# Patient Record
Sex: FEMALE | Race: WHITE | NOT HISPANIC OR LATINO | URBAN - METROPOLITAN AREA
[De-identification: names, ages, dates, MRNs, and addresses within clinical notes are randomized per-mention and may not be internally consistent; named-entity substitution may affect disease eponyms.]

---

## 2017-08-07 ENCOUNTER — INPATIENT (INPATIENT)
Facility: HOSPITAL | Age: 82
LOS: 9 days | Discharge: NURSING, REHAB OR LONG-TERM CARE | End: 2017-08-17
Payer: MEDICARE

## 2017-08-07 ENCOUNTER — OUTPATIENT (OUTPATIENT)
Dept: OUTPATIENT SERVICES | Facility: HOSPITAL | Age: 82
LOS: 1 days | End: 2017-08-07

## 2017-08-07 PROCEDURE — 70450 CT HEAD/BRAIN W/O DYE: CPT | Mod: 26

## 2017-08-07 PROCEDURE — 99223 1ST HOSP IP/OBS HIGH 75: CPT

## 2017-08-07 PROCEDURE — 72125 CT NECK SPINE W/O DYE: CPT | Mod: 26

## 2017-08-07 PROCEDURE — 99285 EMERGENCY DEPT VISIT HI MDM: CPT

## 2017-08-07 PROCEDURE — 73503 X-RAY EXAM HIP UNI 4/> VIEWS: CPT | Mod: 26,LT

## 2017-08-07 PROCEDURE — 72170 X-RAY EXAM OF PELVIS: CPT | Mod: 26

## 2017-08-07 PROCEDURE — 71010: CPT | Mod: 26

## 2017-08-09 ENCOUNTER — OUTPATIENT (OUTPATIENT)
Dept: OUTPATIENT SERVICES | Facility: HOSPITAL | Age: 82
LOS: 1 days | End: 2017-08-09

## 2017-08-09 PROCEDURE — 73501 X-RAY EXAM HIP UNI 1 VIEW: CPT | Mod: 26,LT

## 2017-08-10 ENCOUNTER — OUTPATIENT (OUTPATIENT)
Dept: OUTPATIENT SERVICES | Facility: HOSPITAL | Age: 82
LOS: 1 days | End: 2017-08-10

## 2017-08-10 PROCEDURE — 99232 SBSQ HOSP IP/OBS MODERATE 35: CPT

## 2017-08-11 ENCOUNTER — OUTPATIENT (OUTPATIENT)
Dept: OUTPATIENT SERVICES | Facility: HOSPITAL | Age: 82
LOS: 1 days | End: 2017-08-11

## 2017-08-12 ENCOUNTER — OUTPATIENT (OUTPATIENT)
Dept: OUTPATIENT SERVICES | Facility: HOSPITAL | Age: 82
LOS: 1 days | End: 2017-08-12

## 2017-08-13 ENCOUNTER — OUTPATIENT (OUTPATIENT)
Dept: OUTPATIENT SERVICES | Facility: HOSPITAL | Age: 82
LOS: 1 days | End: 2017-08-13

## 2017-08-14 ENCOUNTER — OUTPATIENT (OUTPATIENT)
Dept: OUTPATIENT SERVICES | Facility: HOSPITAL | Age: 82
LOS: 1 days | End: 2017-08-14

## 2017-08-14 PROCEDURE — 72193 CT PELVIS W/DYE: CPT | Mod: 26

## 2017-08-15 ENCOUNTER — OUTPATIENT (OUTPATIENT)
Dept: OUTPATIENT SERVICES | Facility: HOSPITAL | Age: 82
LOS: 1 days | End: 2017-08-15

## 2017-08-16 ENCOUNTER — OUTPATIENT (OUTPATIENT)
Dept: OUTPATIENT SERVICES | Facility: HOSPITAL | Age: 82
LOS: 1 days | End: 2017-08-16

## 2017-08-17 ENCOUNTER — INPATIENT (INPATIENT)
Facility: HOSPITAL | Age: 82
LOS: 40 days | Discharge: HOME | End: 2017-09-27
Payer: COMMERCIAL

## 2017-08-17 ENCOUNTER — OUTPATIENT (OUTPATIENT)
Dept: OUTPATIENT SERVICES | Facility: HOSPITAL | Age: 82
LOS: 1 days | End: 2017-08-17

## 2017-08-19 PROCEDURE — 73502 X-RAY EXAM HIP UNI 2-3 VIEWS: CPT | Mod: 26,RT

## 2017-08-24 PROBLEM — Z00.00 ENCOUNTER FOR PREVENTIVE HEALTH EXAMINATION: Status: ACTIVE | Noted: 2017-08-24

## 2017-09-05 PROCEDURE — 73630 X-RAY EXAM OF FOOT: CPT | Mod: 26,LT

## 2017-09-05 PROCEDURE — 73600 X-RAY EXAM OF ANKLE: CPT | Mod: 26,LT

## 2017-09-12 PROCEDURE — 72170 X-RAY EXAM OF PELVIS: CPT | Mod: 26

## 2017-09-12 PROCEDURE — 73562 X-RAY EXAM OF KNEE 3: CPT | Mod: 26,LT

## 2017-09-17 ENCOUNTER — EMERGENCY (EMERGENCY)
Facility: HOSPITAL | Age: 82
LOS: 0 days | Discharge: NURSING, REHAB OR LONG-TERM CARE | End: 2017-09-17
Payer: MEDICARE

## 2017-09-17 PROCEDURE — 73562 X-RAY EXAM OF KNEE 3: CPT | Mod: 26,LT

## 2017-09-17 PROCEDURE — 74177 CT ABD & PELVIS W/CONTRAST: CPT | Mod: 26

## 2017-09-17 PROCEDURE — 72125 CT NECK SPINE W/O DYE: CPT | Mod: 26

## 2017-09-17 PROCEDURE — 73610 X-RAY EXAM OF ANKLE: CPT | Mod: 26,LT

## 2017-09-17 PROCEDURE — 70450 CT HEAD/BRAIN W/O DYE: CPT | Mod: 26

## 2017-09-17 PROCEDURE — 99284 EMERGENCY DEPT VISIT MOD MDM: CPT

## 2017-09-17 PROCEDURE — 71260 CT THORAX DX C+: CPT | Mod: 26

## 2017-09-17 PROCEDURE — 73503 X-RAY EXAM HIP UNI 4/> VIEWS: CPT | Mod: 26,LT

## 2017-09-20 PROCEDURE — 71101 X-RAY EXAM UNILAT RIBS/CHEST: CPT | Mod: 26,LT

## 2019-04-02 ENCOUNTER — APPOINTMENT (OUTPATIENT)
Dept: ORTHOPEDIC SURGERY | Facility: CLINIC | Age: 84
End: 2019-04-02
Payer: MEDICARE

## 2019-04-02 VITALS — HEIGHT: 67 IN | BODY MASS INDEX: 23.86 KG/M2 | WEIGHT: 152 LBS

## 2019-04-02 DIAGNOSIS — S72.009A FRACTURE OF UNSPECIFIED PART OF NECK OF UNSPECIFIED FEMUR, INITIAL ENCOUNTER FOR CLOSED FRACTURE: ICD-10-CM

## 2019-04-02 DIAGNOSIS — M17.10 UNILATERAL PRIMARY OSTEOARTHRITIS, UNSPECIFIED KNEE: ICD-10-CM

## 2019-04-02 DIAGNOSIS — M16.10 UNILATERAL PRIMARY OSTEOARTHRITIS, UNSPECIFIED HIP: ICD-10-CM

## 2019-04-02 PROCEDURE — 99203 OFFICE O/P NEW LOW 30 MIN: CPT

## 2019-04-02 RX ORDER — RIVAROXABAN 15 MG/1
15 TABLET, FILM COATED ORAL
Qty: 90 | Refills: 0 | Status: ACTIVE | COMMUNITY
Start: 2018-10-19

## 2019-04-02 RX ORDER — AMIODARONE HYDROCHLORIDE 200 MG/1
200 TABLET ORAL
Qty: 30 | Refills: 0 | Status: ACTIVE | COMMUNITY
Start: 2018-11-26

## 2019-04-02 RX ORDER — PROMETHAZINE HYDROCHLORIDE AND CODEINE PHOSPHATE 6.25; 1 MG/5ML; MG/5ML
6.25-1 SOLUTION ORAL
Refills: 0 | Status: ACTIVE | COMMUNITY

## 2019-04-02 RX ORDER — RANITIDINE 300 MG/1
300 TABLET ORAL
Qty: 30 | Refills: 0 | Status: ACTIVE | COMMUNITY
Start: 2019-01-26

## 2019-04-02 RX ORDER — WARFARIN 2.5 MG/1
2.5 TABLET ORAL
Refills: 0 | Status: ACTIVE | COMMUNITY

## 2019-04-02 RX ORDER — METOPROLOL SUCCINATE 25 MG/1
25 TABLET, EXTENDED RELEASE ORAL
Qty: 45 | Refills: 0 | Status: ACTIVE | COMMUNITY
Start: 2017-12-18

## 2019-04-02 RX ORDER — ACETAMINOPHEN AND CODEINE 300; 30 MG/1; MG/1
300-30 TABLET ORAL
Refills: 0 | Status: ACTIVE | COMMUNITY

## 2019-04-02 RX ORDER — AMOXICILLIN 500 MG/1
500 TABLET, FILM COATED ORAL
Qty: 4 | Refills: 0 | Status: ACTIVE | COMMUNITY
Start: 2019-01-14

## 2019-04-02 RX ORDER — B-COMPLEX WITH VITAMIN C
500-200 TABLET ORAL
Qty: 90 | Refills: 0 | Status: ACTIVE | COMMUNITY
Start: 2017-12-05

## 2019-04-02 RX ORDER — TRAMADOL HYDROCHLORIDE AND ACETAMINOPHEN 37.5; 325 MG/1; MG/1
37.5-325 TABLET, FILM COATED ORAL
Refills: 0 | Status: ACTIVE | COMMUNITY

## 2019-04-02 RX ORDER — CLONAZEPAM 1 MG/1
1 TABLET ORAL
Qty: 30 | Refills: 0 | Status: ACTIVE | COMMUNITY
Start: 2019-02-12

## 2019-04-02 NOTE — REASON FOR VISIT
[Initial Visit] : an initial visit for [Hip Pain] : hip pain [FreeTextEntry2] : EXISTING PATIENT - PREVIOUS NOTES IN ROBBIE SYSTEM - LEFT HIP PAIN

## 2019-04-02 NOTE — DISCUSSION/SUMMARY
[de-identified] : Probable pain is of lumbar origin. Will return in a week if not improved on extrastrength tylenol(pt on blood thinners)\par X ray swill be obtained at that point.

## 2019-04-02 NOTE — HISTORY OF PRESENT ILLNESS
[de-identified] : REPORTS PAIN ON THE LEFT HIP INTO GROIN AREA. PATIENT FELL ONE YEAR AGO - RECENT HIP PAIN BEGAN ABOUT 4 DAYS AGO. \par 03/29/18\par Pt is s/p Hemiarthroplasty left hip approximately 16 mos.

## 2019-04-02 NOTE — PHYSICAL EXAM
[de-identified] : Pt has full passive IR/ER of the L hip, painless. C/o low back pain with radiations to gtroin.

## 2019-04-16 ENCOUNTER — APPOINTMENT (OUTPATIENT)
Dept: ORTHOPEDIC SURGERY | Facility: CLINIC | Age: 84
End: 2019-04-16
Payer: MEDICARE

## 2019-04-16 VITALS — WEIGHT: 152 LBS | HEIGHT: 67 IN | BODY MASS INDEX: 23.86 KG/M2

## 2019-04-16 DIAGNOSIS — M54.5 LOW BACK PAIN: ICD-10-CM

## 2019-04-16 PROCEDURE — 72100 X-RAY EXAM L-S SPINE 2/3 VWS: CPT

## 2019-04-16 PROCEDURE — 99213 OFFICE O/P EST LOW 20 MIN: CPT

## 2019-04-16 NOTE — DISCUSSION/SUMMARY
[de-identified] : I believe the patient would benefit from physical therapy for left lower extremity strengthening she is given a physical therapy prescription she'll followup with me in 6 weeks' time

## 2019-04-16 NOTE — PHYSICAL EXAM
[de-identified] : Left lower extremity patient's wound is well-healed leg lengths appeared equal. She is neurovascularly intact in the left lower extremity. There is significant quadriceps atrophy of the left as compared to the right quadriceps. She is neurovascularly intact

## 2019-04-16 NOTE — HISTORY OF PRESENT ILLNESS
[de-identified] : Patient is here with a complaint of left hip pain she is status post proximally one year and a few months left hip hemiarthroplasty for fracture. The surgery was performed by another surgeon in [facility]. Patient is complaining of some weakness with hip flexion she can ambulate without a cane currently but she does feel that she is still weaker on her left lower extremity than her right.

## 2019-10-01 ENCOUNTER — APPOINTMENT (OUTPATIENT)
Dept: ORTHOPEDIC SURGERY | Facility: CLINIC | Age: 84
End: 2019-10-01
Payer: MEDICARE

## 2019-10-01 PROCEDURE — 73521 X-RAY EXAM HIPS BI 2 VIEWS: CPT

## 2019-10-01 PROCEDURE — 99214 OFFICE O/P EST MOD 30 MIN: CPT

## 2019-10-01 NOTE — PHYSICAL EXAM
[de-identified] : Left hip has full passive internal/external rotation without any significant discomfort she is neurovascularly intact distally. Wound is well healed. [de-identified] : AP lateral of left hip show a bipolar hemiarthroplasty cemented stem. There is some evidence that there may be some narrowing of the cartilage in the acetabulum. The stem itself seems well fixed without any evidence of loosening migration or failure.

## 2019-10-01 NOTE — DISCUSSION/SUMMARY
[de-identified] : Patient if you recall had significant spinal arthritis probable stenosis which I believe is contributing factor here she may also have some wear of the native acetabular cartilage. She'll be sent to physical therapy for conditioning and strengthening she will follow up with me as needed.

## 2019-10-01 NOTE — HISTORY OF PRESENT ILLNESS
[de-identified] : Patient is here complaining of increasing left thigh pain. She has intermittent discomfort today actually she feels she has no pain whatsoever. Recall she is  status post one half years left hip bipolar hemiarthroplasty.She is currently ambulating with a cane and feels highly fatigue most the time.

## 2020-01-16 ENCOUNTER — APPOINTMENT (OUTPATIENT)
Dept: ORTHOPEDIC SURGERY | Facility: CLINIC | Age: 85
End: 2020-01-16
Payer: MEDICARE

## 2020-01-16 PROCEDURE — 20611 DRAIN/INJ JOINT/BURSA W/US: CPT | Mod: LT

## 2020-01-16 PROCEDURE — 73030 X-RAY EXAM OF SHOULDER: CPT | Mod: LT

## 2020-01-16 PROCEDURE — 99214 OFFICE O/P EST MOD 30 MIN: CPT | Mod: 25

## 2020-01-16 NOTE — DISCUSSION/SUMMARY
[de-identified] : We'll arrange for the patient had physical therapy. She also told to take Tylenol for pain relief due to the fact that she is on blood thinner. She'll followup with me in a week not improved for potential second injection.

## 2020-01-16 NOTE — HISTORY OF PRESENT ILLNESS
[de-identified] : Patient is she'll a new complaint of pain in her left shoulder. This began over the [place] condyle. She's having difficulty with overhead activities and putting on jackets. She recalls no specific accident or injury.

## 2020-01-16 NOTE — PHYSICAL EXAM
[de-identified] : Patient on exam today has good cuff strength testing is resisted for elevation adduction she is limited actively to approximately 90° of forward elevation 45° of abduction. She has decreased internal rotation to discomfort positive impingement sign. [de-identified] : AP and lateral of the left shoulder obtained today show a well-preserved bony spaces nodes arthritis mild a.c. joint or glenohumeral articulation.

## 2020-01-16 NOTE — REASON FOR VISIT
[Follow-Up Visit] : a follow-up visit for [FreeTextEntry2] : LEFT SHOULDER/ARM/ELBOW PAIN AND ACHES - FEELS WEAK AT TIMES

## 2020-01-16 NOTE — PROCEDURE
[de-identified] : Patient was given a cortisone injection into subacromial space left shoulder today. This was done under sterile conditions an ultrasound guidance. Patient tolerated the procedure well.

## 2020-08-24 ENCOUNTER — TRANSCRIPTION ENCOUNTER (OUTPATIENT)
Age: 85
End: 2020-08-24

## 2020-10-08 ENCOUNTER — APPOINTMENT (OUTPATIENT)
Dept: ORTHOPEDIC SURGERY | Facility: CLINIC | Age: 85
End: 2020-10-08
Payer: MEDICARE

## 2020-10-08 PROCEDURE — 99214 OFFICE O/P EST MOD 30 MIN: CPT

## 2020-10-08 NOTE — DISCUSSION/SUMMARY
[de-identified] : Patient will have home physical therapy arranged for overall conditioning lower extremity strengthening. We'll do this twice liquefied and she'll follow with me in 5 weeks if not really improved.

## 2020-10-08 NOTE — HISTORY OF PRESENT ILLNESS
[de-identified] : Patient is here with a new complaint she is complaining of bilateral leg soreness was playing some intermittent sense of weakness mostly attributed to lack of activity and isolation. Patient states she has difficulty with stairclimbing gets winded easily. Recall she is status post bilateral knee replacements by another surgeon.

## 2020-10-08 NOTE — PHYSICAL EXAM
[de-identified] : Both lower extremities today both knees have range of motion of 0-135°. There is no crepitus there is mild warmth about the knee is quite tender and is somewhat atrophied in both right and left lower extremity she is neurovascular intact distally both knees are stable

## 2022-07-12 ENCOUNTER — APPOINTMENT (OUTPATIENT)
Dept: ORTHOPEDIC SURGERY | Facility: CLINIC | Age: 87
End: 2022-07-12

## 2022-07-12 VITALS — WEIGHT: 151 LBS | HEIGHT: 66.5 IN | BODY MASS INDEX: 23.98 KG/M2

## 2022-07-12 PROCEDURE — 99213 OFFICE O/P EST LOW 20 MIN: CPT

## 2022-07-12 PROCEDURE — 73522 X-RAY EXAM HIPS BI 3-4 VIEWS: CPT

## 2022-07-12 PROCEDURE — 73521 X-RAY EXAM HIPS BI 2 VIEWS: CPT | Mod: 59

## 2022-07-12 RX ORDER — FAMOTIDINE 40 MG/1
40 TABLET, FILM COATED ORAL
Qty: 30 | Refills: 0 | Status: ACTIVE | COMMUNITY
Start: 2022-06-04

## 2022-07-12 RX ORDER — LORAZEPAM 1 MG/1
1 TABLET ORAL
Qty: 30 | Refills: 0 | Status: ACTIVE | COMMUNITY
Start: 2022-06-30

## 2022-07-12 RX ORDER — CEFUROXIME AXETIL 500 MG/1
500 TABLET ORAL
Qty: 20 | Refills: 0 | Status: ACTIVE | COMMUNITY
Start: 2022-02-10

## 2022-07-12 NOTE — REASON FOR VISIT
[FreeTextEntry2] : Rt hip & groin painx2 months, also BL knee weakness for years. Denied any accident.  Hx of Rt knee replacement 2013, RLt knee replacement 2016.

## 2022-07-12 NOTE — DISCUSSION/SUMMARY
[Medication Risks Reviewed] : Medication risks reviewed [de-identified] : Patient's clinical exam history and radiographs are all consistent with right-sided lumbar radiculopathy.  I explained the underlying etiology of her pain.  We talked about her options patient was recommended a trial of the Medrol Dosepak she deferred.  She will simply take Tylenol around-the-clock for the next 3 to 4 days follow-up in a week if not improved

## 2022-07-12 NOTE — HISTORY OF PRESENT ILLNESS
[de-identified] : Old patient returns today with a complaint of right groin pain.  This is ongoing for about 2 months she recalls no specific accident or injury.  Patient states she has a nagging soreness in the area sometimes radiates into her lower back sometimes into the groin.  Denies any change in bowel or bladder habits or any obvious weakness or numbness in the right lower extremity.

## 2022-07-12 NOTE — PHYSICAL EXAM
[de-identified] : Physical exam patient has some mild paraspinal tenderness to palpation of the right-sided paraspinal lumbar musculature.  Negative straight leg raising test no significant atrophy of the right lower extremity noted she has full passive internal and external rotation of the right hip at 90 degrees with no mechanical block or pain. [de-identified] : Hip x-rays were ordered today.  AP and lateral of both hips were obtained showing well-preserved joint space in the right hip with no evidence of fracture or other bony abnormality.  Left hip showed a well-positioned bipolar hemiarthroplasty cemented.

## 2022-07-19 ENCOUNTER — APPOINTMENT (OUTPATIENT)
Dept: ORTHOPEDIC SURGERY | Facility: CLINIC | Age: 87
End: 2022-07-19

## 2024-01-09 ENCOUNTER — APPOINTMENT (OUTPATIENT)
Dept: ORTHOPEDIC SURGERY | Facility: CLINIC | Age: 89
End: 2024-01-09
Payer: MEDICARE

## 2024-01-09 DIAGNOSIS — M25.512 PAIN IN LEFT SHOULDER: ICD-10-CM

## 2024-01-09 PROCEDURE — 99214 OFFICE O/P EST MOD 30 MIN: CPT | Mod: 25

## 2024-01-09 PROCEDURE — 20610 DRAIN/INJ JOINT/BURSA W/O US: CPT | Mod: LT

## 2024-06-18 ENCOUNTER — APPOINTMENT (OUTPATIENT)
Dept: ORTHOPEDIC SURGERY | Facility: CLINIC | Age: 89
End: 2024-06-18
Payer: MEDICARE

## 2024-06-18 DIAGNOSIS — M54.16 RADICULOPATHY, LUMBAR REGION: ICD-10-CM

## 2024-06-18 PROCEDURE — 99214 OFFICE O/P EST MOD 30 MIN: CPT

## 2024-06-18 NOTE — DISCUSSION/SUMMARY
[de-identified] : Patient discussed the underlying etiology of her symptoms.  I believe all of this is related to the fact that she has an assumed degenerative scoliosis.  She also has evidence of right-sided lumbar radiculopathy.  Patient is currently on a blood thinner so she cannot take any of the standard anti-inflammatories and will defer on use of the steroid taper at this point.  Patient was told to go on a 5-day run of Tylenol every 6 8 hours.  We will also arrange for her to have home physical therapy and her [facility] residents for the next 4 to 5 weeks for ambulation and gait training..  Patient does see sustained symptomatic improvement with the symptoms especially of the radicular symptoms MRI/CT scan may be warranted in the future.  This consultation lasted 30 minutes.

## 2024-06-18 NOTE — REASON FOR VISIT
[Follow-Up Visit] : a follow-up visit for [Knee Pain] : knee pain [FreeTextEntry2] : RIGHT KNEE PAIN.

## 2024-06-18 NOTE — HISTORY OF PRESENT ILLNESS
[de-identified] : Patient well-known to us returns today she has 2 complaints 1 is that she feels off balance when she is ambulating she really needs to be holding onto a wall or utilizing a cane at this point.  She does not recall any recent fall she is also complaining of the lateral right leg pain.  She denies any numbness or weakness in the right lower extremity or any radiating pain from her lumbar spine.  She also denies any change in bowel or bladder habits.

## 2024-06-18 NOTE — PHYSICAL EXAM
[de-identified] : Patient has minimal tenderness palpation along the lateral aspect of the right leg.  There is no warmth.  No obvious varicosities noted.  Range of motion of the knee and ankle are full.  Neurovascular intact distally.  Patient does have some mild paraspinal tenderness palpation right-sided lumbar paraspinal lumbar musculature.  On stance patient has evidence of probable degenerative scoliosis of the lumbar spine.

## 2024-11-14 ENCOUNTER — APPOINTMENT (OUTPATIENT)
Dept: ORTHOPEDIC SURGERY | Facility: CLINIC | Age: 89
End: 2024-11-14